# Patient Record
Sex: MALE | Race: WHITE | NOT HISPANIC OR LATINO | Employment: FULL TIME | URBAN - METROPOLITAN AREA
[De-identification: names, ages, dates, MRNs, and addresses within clinical notes are randomized per-mention and may not be internally consistent; named-entity substitution may affect disease eponyms.]

---

## 2023-10-10 DIAGNOSIS — F41.9 ANXIETY AND DEPRESSION: ICD-10-CM

## 2023-10-10 DIAGNOSIS — F32.A ANXIETY AND DEPRESSION: ICD-10-CM

## 2023-10-10 RX ORDER — SERTRALINE HYDROCHLORIDE 50 MG/1
50 TABLET, FILM COATED ORAL 2 TIMES DAILY
COMMUNITY
Start: 2022-01-13 | End: 2023-10-10 | Stop reason: SDUPTHER

## 2023-10-10 RX ORDER — SERTRALINE HYDROCHLORIDE 50 MG/1
50 TABLET, FILM COATED ORAL 2 TIMES DAILY
Qty: 60 TABLET | Refills: 0 | Status: SHIPPED | OUTPATIENT
Start: 2023-10-10 | End: 2023-12-14 | Stop reason: SDUPTHER

## 2023-11-06 DIAGNOSIS — F32.A ANXIETY AND DEPRESSION: ICD-10-CM

## 2023-11-06 DIAGNOSIS — F41.9 ANXIETY AND DEPRESSION: ICD-10-CM

## 2023-11-06 RX ORDER — SERTRALINE HYDROCHLORIDE 50 MG/1
TABLET, FILM COATED ORAL
Qty: 60 TABLET | Refills: 0 | OUTPATIENT
Start: 2023-11-06

## 2023-12-13 ENCOUNTER — TELEPHONE (OUTPATIENT)
Dept: PRIMARY CARE | Facility: CLINIC | Age: 18
End: 2023-12-13

## 2023-12-13 DIAGNOSIS — F32.A ANXIETY AND DEPRESSION: ICD-10-CM

## 2023-12-13 DIAGNOSIS — F41.9 ANXIETY AND DEPRESSION: ICD-10-CM

## 2023-12-14 RX ORDER — SERTRALINE HYDROCHLORIDE 50 MG/1
50 TABLET, FILM COATED ORAL 2 TIMES DAILY
Qty: 60 TABLET | Refills: 0 | Status: SHIPPED | OUTPATIENT
Start: 2023-12-14 | End: 2024-02-16 | Stop reason: SDUPTHER

## 2024-02-16 ENCOUNTER — TELEPHONE (OUTPATIENT)
Dept: PRIMARY CARE | Facility: CLINIC | Age: 19
End: 2024-02-16
Payer: MEDICAID

## 2024-02-16 DIAGNOSIS — F41.9 ANXIETY AND DEPRESSION: ICD-10-CM

## 2024-02-16 DIAGNOSIS — F32.A ANXIETY AND DEPRESSION: ICD-10-CM

## 2024-02-16 RX ORDER — SERTRALINE HYDROCHLORIDE 50 MG/1
50 TABLET, FILM COATED ORAL 2 TIMES DAILY
Qty: 60 TABLET | Refills: 0 | Status: SHIPPED | OUTPATIENT
Start: 2024-02-16 | End: 2024-03-18 | Stop reason: SDUPTHER

## 2024-02-16 NOTE — TELEPHONE ENCOUNTER
REFILL   ZOLOFT 50 MG TWICE DAILY  TOY KHALIL    PATIENT IS COMPLETELY OUT OF MED  AN APT IS SCHEDULED FOR 3/18/24 AND HE IS AWARE HE NEEDS TO BE SEEN  THANK YOU

## 2024-03-18 ENCOUNTER — OFFICE VISIT (OUTPATIENT)
Dept: PRIMARY CARE | Facility: CLINIC | Age: 19
End: 2024-03-18
Payer: MEDICAID

## 2024-03-18 ENCOUNTER — LAB (OUTPATIENT)
Dept: LAB | Facility: LAB | Age: 19
End: 2024-03-18
Payer: MEDICAID

## 2024-03-18 VITALS
DIASTOLIC BLOOD PRESSURE: 67 MMHG | SYSTOLIC BLOOD PRESSURE: 131 MMHG | BODY MASS INDEX: 22.51 KG/M2 | HEART RATE: 66 BPM | HEIGHT: 72 IN | OXYGEN SATURATION: 98 % | TEMPERATURE: 97.3 F | WEIGHT: 166.2 LBS

## 2024-03-18 DIAGNOSIS — F41.9 ANXIETY AND DEPRESSION: Primary | ICD-10-CM

## 2024-03-18 DIAGNOSIS — F32.A ANXIETY AND DEPRESSION: ICD-10-CM

## 2024-03-18 DIAGNOSIS — Z11.4 SCREENING FOR HIV WITHOUT PRESENCE OF RISK FACTORS: ICD-10-CM

## 2024-03-18 DIAGNOSIS — Z11.59 NEED FOR HEPATITIS C SCREENING TEST: ICD-10-CM

## 2024-03-18 DIAGNOSIS — F41.9 ANXIETY AND DEPRESSION: ICD-10-CM

## 2024-03-18 DIAGNOSIS — F32.A ANXIETY AND DEPRESSION: Primary | ICD-10-CM

## 2024-03-18 DIAGNOSIS — E55.9 VITAMIN D DEFICIENCY: ICD-10-CM

## 2024-03-18 LAB
25(OH)D3 SERPL-MCNC: 24 NG/ML (ref 30–100)
ALBUMIN SERPL BCP-MCNC: 4.7 G/DL (ref 3.4–5)
ALP SERPL-CCNC: 63 U/L (ref 33–120)
ALT SERPL W P-5'-P-CCNC: 16 U/L (ref 10–52)
ANION GAP SERPL CALC-SCNC: 13 MMOL/L (ref 10–20)
AST SERPL W P-5'-P-CCNC: 16 U/L (ref 9–39)
BASOPHILS # BLD AUTO: 0.07 X10*3/UL (ref 0–0.1)
BASOPHILS NFR BLD AUTO: 0.9 %
BILIRUB SERPL-MCNC: 0.3 MG/DL (ref 0–1.2)
BUN SERPL-MCNC: 15 MG/DL (ref 6–23)
CALCIUM SERPL-MCNC: 9.7 MG/DL (ref 8.6–10.6)
CHLORIDE SERPL-SCNC: 102 MMOL/L (ref 98–107)
CHOLEST SERPL-MCNC: 176 MG/DL (ref 0–199)
CHOLESTEROL/HDL RATIO: 3.3
CO2 SERPL-SCNC: 28 MMOL/L (ref 21–32)
CREAT SERPL-MCNC: 0.91 MG/DL (ref 0.5–1.3)
EGFRCR SERPLBLD CKD-EPI 2021: >90 ML/MIN/1.73M*2
EOSINOPHIL # BLD AUTO: 0.21 X10*3/UL (ref 0–0.7)
EOSINOPHIL NFR BLD AUTO: 2.8 %
ERYTHROCYTE [DISTWIDTH] IN BLOOD BY AUTOMATED COUNT: 12.1 % (ref 11.5–14.5)
GLUCOSE SERPL-MCNC: 90 MG/DL (ref 74–99)
HCT VFR BLD AUTO: 45.9 % (ref 41–52)
HCV AB SER QL: NONREACTIVE
HDLC SERPL-MCNC: 54 MG/DL
HGB BLD-MCNC: 14.8 G/DL (ref 13.5–17.5)
IMM GRANULOCYTES # BLD AUTO: 0.01 X10*3/UL (ref 0–0.7)
IMM GRANULOCYTES NFR BLD AUTO: 0.1 % (ref 0–0.9)
LDLC SERPL CALC-MCNC: 97 MG/DL
LYMPHOCYTES # BLD AUTO: 2.05 X10*3/UL (ref 1.2–4.8)
LYMPHOCYTES NFR BLD AUTO: 27.7 %
MCH RBC QN AUTO: 30 PG (ref 26–34)
MCHC RBC AUTO-ENTMCNC: 32.2 G/DL (ref 32–36)
MCV RBC AUTO: 93 FL (ref 80–100)
MONOCYTES # BLD AUTO: 0.59 X10*3/UL (ref 0.1–1)
MONOCYTES NFR BLD AUTO: 8 %
NEUTROPHILS # BLD AUTO: 4.48 X10*3/UL (ref 1.2–7.7)
NEUTROPHILS NFR BLD AUTO: 60.5 %
NON HDL CHOLESTEROL: 122 MG/DL (ref 0–149)
NRBC BLD-RTO: 0 /100 WBCS (ref 0–0)
PLATELET # BLD AUTO: 255 X10*3/UL (ref 150–450)
POTASSIUM SERPL-SCNC: 5 MMOL/L (ref 3.5–5.3)
PROT SERPL-MCNC: 7.3 G/DL (ref 6.4–8.2)
RBC # BLD AUTO: 4.93 X10*6/UL (ref 4.5–5.9)
SODIUM SERPL-SCNC: 138 MMOL/L (ref 136–145)
TRIGL SERPL-MCNC: 124 MG/DL (ref 0–149)
TSH SERPL-ACNC: 3.38 MIU/L (ref 0.44–3.98)
VLDL: 25 MG/DL (ref 0–40)
WBC # BLD AUTO: 7.4 X10*3/UL (ref 4.4–11.3)

## 2024-03-18 PROCEDURE — 86803 HEPATITIS C AB TEST: CPT

## 2024-03-18 PROCEDURE — 84443 ASSAY THYROID STIM HORMONE: CPT

## 2024-03-18 PROCEDURE — 99213 OFFICE O/P EST LOW 20 MIN: CPT | Performed by: INTERNAL MEDICINE

## 2024-03-18 PROCEDURE — 85025 COMPLETE CBC W/AUTO DIFF WBC: CPT

## 2024-03-18 PROCEDURE — 82306 VITAMIN D 25 HYDROXY: CPT

## 2024-03-18 PROCEDURE — 36415 COLL VENOUS BLD VENIPUNCTURE: CPT

## 2024-03-18 PROCEDURE — 1036F TOBACCO NON-USER: CPT | Performed by: INTERNAL MEDICINE

## 2024-03-18 PROCEDURE — 80053 COMPREHEN METABOLIC PANEL: CPT

## 2024-03-18 PROCEDURE — 80061 LIPID PANEL: CPT

## 2024-03-18 PROCEDURE — 87389 HIV-1 AG W/HIV-1&-2 AB AG IA: CPT

## 2024-03-18 RX ORDER — SERTRALINE HYDROCHLORIDE 50 MG/1
50 TABLET, FILM COATED ORAL 2 TIMES DAILY
Qty: 180 TABLET | Refills: 3 | Status: SHIPPED | OUTPATIENT
Start: 2024-03-18

## 2024-03-18 NOTE — PROGRESS NOTES
Subjective   Patient ID: Eriberto Beaulieu is a 19 y.o. male who presents for Follow-up (Med refill ).    Assessment/Plan     Problem List Items Addressed This Visit       Anxiety and depression - Primary     PHQ less than 5 continue Zoloft advised to get PHQ twice a year         Relevant Medications    sertraline (Zoloft) 50 mg tablet    Other Relevant Orders    HIV 1/2 Antigen/Antibody Screen with Reflex to Confirmation    Lipid panel    Hepatitis C antibody    CBC and Auto Differential    Comprehensive Metabolic Panel    TSH with reflex to Free T4 if abnormal    Vitamin D 25-Hydroxy,Total (for eval of Vitamin D levels)    Need for hepatitis C screening test    Relevant Orders    Hepatitis C antibody    Screening for HIV without presence of risk factors    Relevant Orders    HIV 1/2 Antigen/Antibody Screen with Reflex to Confirmation    Vitamin D deficiency     Advice your vitamin D level is low take vitamin C calcium plus vitamin D 50,000 unit one every week for 3 months and repeat testing or 3 months        Patient was evaluated today, problem list was reviewed, problems and concerns addressed, Rx list reviewed and updated, lab and tests were noted and reviewed. Life style changes were discussed, always it works better if we eat plant based diet and plenty of fibres and roughage. Consume adequate amount of water and avoid alcohol, light to moderate physical activities and stress reduction are always beneficial for ongoing physical well being. Do not forget to have 6 to 7 hours of sleep regularly and avoid late night meenu screen exposure.      HPI this is a 19-year-old patient have vitamin D deficiency associate with anxiety depression taking Zoloft and vitamin D supplement    Negative for suicide homicide    Negative for chemical abuse    Negative for nausea vomiting diarrhea constipation  Past Medical History:   Diagnosis Date    Attention-deficit hyperactivity disorder, predominantly inattentive type 01/13/2022     Attention deficit hyperactivity disorder (ADHD), predominantly inattentive type    Encounter for immunization 01/13/2022    Encounter for immunization    Personal history of other specified conditions 01/13/2022    History of palpitations     Past Surgical History:   Procedure Laterality Date    OTHER SURGICAL HISTORY  01/13/2022    No history of surgery     No Known Allergies  Current Outpatient Medications   Medication Sig Dispense Refill    sertraline (Zoloft) 50 mg tablet Take 1 tablet (50 mg) by mouth 2 times a day. Needs to be seen 180 tablet 3     No current facility-administered medications for this visit.     No family history on file.  Social History     Socioeconomic History    Marital status: Single     Spouse name: None    Number of children: None    Years of education: None    Highest education level: None   Occupational History    None   Tobacco Use    Smoking status: Never    Smokeless tobacco: Never   Substance and Sexual Activity    Alcohol use: Yes     Comment: social    Drug use: Never    Sexual activity: None   Other Topics Concern    None   Social History Narrative    None     Social Determinants of Health     Financial Resource Strain: Not on file   Food Insecurity: Not on file   Transportation Needs: Not on file   Physical Activity: Not on file   Stress: Not on file   Social Connections: Not on file   Intimate Partner Violence: Not on file   Housing Stability: Not on file     Immunization History   Administered Date(s) Administered    DTP 2003, 2003, 2003, 05/20/2004, 08/13/2008    DTaP vaccine, pediatric  (INFANRIX) 2003, 2003, 2003, 05/20/2004    Hepatitis B vaccine, pediatric/adolescent (RECOMBIVAX, ENGERIX) 2003, 2003, 02/02/2004    HiB, unspecified 2003, 2003, 02/02/2004    Hib / Hep B 2003, 2003, 02/02/2004    MMR and varicella combined vaccine, subcutaneous (PROQUAD) 06/01/2007    MMR vaccine, subcutaneous (MMR II)  02/02/2004, 06/01/2007    Meningococcal ACWY vaccine (MENVEO) 10/01/2020    Pneumococcal Conjugate PCV 7 2003, 2003, 2003, 01/27/2005    Poliovirus vaccine, subcutaneous (IPOL) 2003, 2003, 05/20/2004, 03/20/2005, 08/13/2008       Review of Systems  Review of systems is otherwise negative unless stated above or in history of present illness.    Objective   Visit Vitals  /67 (BP Location: Left arm, Patient Position: Sitting, BP Cuff Size: Adult)   Pulse 66   Temp 36.3 °C (97.3 °F)   Ht 1.829 m (6')   Wt 75.4 kg (166 lb 3.2 oz)   SpO2 98%   BMI 22.54 kg/m²   Smoking Status Never   BSA 1.96 m²     Physical Exam  Constitutional:       General: not in acute distress.   HENT:      Head: Normocephalic and atraumatic.      Nose: Nose normal.   Eyes:      Extraocular Movements: Extraocular movements intact.      Conjunctiva/sclera: Conjunctivae normal.   Cardiovascular:      Rate and Rhythm: Normal rate ,  No M/R/G  Pulmonary:      Effort: Pulmonary effort is normal.      Breath sounds: Normal, Bilat Equal AE  Skin:     General: Skin is warm.   Neurological:      Mental Status: He is alert and oriented to person, place, and time.   Psychiatric:         Mood and Affect: Mood normal.         Behavior: Behavior normal.   Musculoskeletal   FROM in all extremitirs,  Joint-no swelling or tenderness    No visits with results within 4 Month(s) from this visit.   Latest known visit with results is:   [facility] Encounter on 09/30/2022   Component Date Value Ref Range Status    Vitamin D, 25-Hydroxy 09/30/2022 24 (A)  ng/mL Final       Radiology: Reviewed imaging in powerchart.  No results found.      Charting was completed using voice recognition technology and may include unintended errors.

## 2024-03-19 ENCOUNTER — TELEPHONE (OUTPATIENT)
Dept: PRIMARY CARE | Facility: CLINIC | Age: 19
End: 2024-03-19
Payer: MEDICAID

## 2024-03-19 DIAGNOSIS — E55.9 VITAMIN D DEFICIENCY: ICD-10-CM

## 2024-03-19 LAB — HIV 1+2 AB+HIV1 P24 AG SERPL QL IA: NONREACTIVE

## 2024-03-19 RX ORDER — ERGOCALCIFEROL 1.25 MG/1
50000 CAPSULE ORAL
Qty: 12 CAPSULE | Refills: 0 | Status: SHIPPED | OUTPATIENT
Start: 2024-03-19 | End: 2024-06-11

## 2024-03-19 NOTE — TELEPHONE ENCOUNTER
----- Message from Lissette Garduno MA sent at 3/19/2024 10:45 AM EDT -----    ----- Message -----  From: Shayne Bailey MD  Sent: 3/19/2024  10:29 AM EDT  To: Lissette Garduno MA    Advice your vitamin D level is low take vitamin C calcium plus vitamin D 50,000 unit one every week for 3 months and repeat testing or 3 months

## 2024-12-05 ENCOUNTER — OFFICE VISIT (OUTPATIENT)
Dept: PRIMARY CARE | Facility: CLINIC | Age: 19
End: 2024-12-05
Payer: COMMERCIAL

## 2024-12-05 VITALS
HEART RATE: 69 BPM | SYSTOLIC BLOOD PRESSURE: 109 MMHG | DIASTOLIC BLOOD PRESSURE: 68 MMHG | OXYGEN SATURATION: 96 % | WEIGHT: 162.4 LBS | BODY MASS INDEX: 22 KG/M2 | HEIGHT: 72 IN

## 2024-12-05 DIAGNOSIS — F41.9 ANXIETY AND DEPRESSION: Primary | ICD-10-CM

## 2024-12-05 DIAGNOSIS — E55.9 VITAMIN D DEFICIENCY: ICD-10-CM

## 2024-12-05 DIAGNOSIS — F32.A ANXIETY AND DEPRESSION: Primary | ICD-10-CM

## 2024-12-05 DIAGNOSIS — R73.9 HYPERGLYCEMIA: ICD-10-CM

## 2024-12-05 PROBLEM — Z11.4 SCREENING FOR HIV WITHOUT PRESENCE OF RISK FACTORS: Status: RESOLVED | Noted: 2024-03-18 | Resolved: 2024-12-05

## 2024-12-05 PROBLEM — Z11.59 NEED FOR HEPATITIS C SCREENING TEST: Status: RESOLVED | Noted: 2024-03-18 | Resolved: 2024-12-05

## 2024-12-05 PROCEDURE — 99213 OFFICE O/P EST LOW 20 MIN: CPT | Performed by: INTERNAL MEDICINE

## 2024-12-05 PROCEDURE — 1036F TOBACCO NON-USER: CPT | Performed by: INTERNAL MEDICINE

## 2024-12-05 PROCEDURE — 3008F BODY MASS INDEX DOCD: CPT | Performed by: INTERNAL MEDICINE

## 2024-12-05 ASSESSMENT — PATIENT HEALTH QUESTIONNAIRE - PHQ9
SUM OF ALL RESPONSES TO PHQ9 QUESTIONS 1 AND 2: 0
1. LITTLE INTEREST OR PLEASURE IN DOING THINGS: NOT AT ALL
2. FEELING DOWN, DEPRESSED OR HOPELESS: NOT AT ALL

## 2024-12-05 NOTE — PROGRESS NOTES
Subjective   Patient ID: Eriberto Beaulieu is a 19 y.o. male who presents for Follow-up.    Assessment/Plan     Problem List Items Addressed This Visit       Anxiety and depression - Primary     PHQ 6 continue Zoloft follow-up every 6-month not suicidal         Relevant Orders    CBC and Auto Differential    Comprehensive Metabolic Panel    Magnesium    Lipid Panel    Hemoglobin A1C    TSH with reflex to Free T4 if abnormal    Vitamin D deficiency     Advice your vitamin D level is low take vitamin C calcium plus vitamin D 50,000 unit one every week for 3 months and repeat testing or 3 months         Relevant Orders    CBC and Auto Differential    Comprehensive Metabolic Panel    Magnesium    Lipid Panel    Hemoglobin A1C    TSH with reflex to Free T4 if abnormal    Vitamin D 25-Hydroxy,Total (for eval of Vitamin D levels)    Hyperglycemia    Relevant Orders    CBC and Auto Differential    Comprehensive Metabolic Panel    Magnesium    Lipid Panel    Hemoglobin A1C    TSH with reflex to Free T4 if abnormal     Patient was evaluated today, problem list was reviewed, problems and concerns addressed, Rx list reviewed and updated, lab and tests were noted and reviewed. Life style changes were discussed, always it works better if we eat plant based diet and plenty of fibres and roughage. Consume adequate amount of water and avoid alcohol, light to moderate physical activities and stress reduction are always beneficial for ongoing physical well being. Do not forget to have 6 to 7 hours of sleep regularly and avoid late night meenu screen exposure.    HPI 19-year-old patient who is single no children    No brother 1 sister    Mother father good health    Negative for drug alcohol marijuana    Personal history of poor concentration anxiety with depression onset gradually duration few months progress slowly aggravated by cold weather send social pressure    Negative for suicide homicide ideation    Negative for chemical  abuse    Negative for headache or chest pain    PHQ was done less than 6    Continue Zoloft given vitamin C vitamin D calcium supplement follow-up advised not to smoke drinks or any drugs  Past Medical History:   Diagnosis Date    Attention-deficit hyperactivity disorder, 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 Needs to be seen 180 tablet 3     No current facility-administered medications for this visit.     No family history on file.  Social History     Socioeconomic History    Marital status: Single   Tobacco Use    Smoking status: Never    Smokeless tobacco: Never   Substance and Sexual Activity    Alcohol use: Yes     Comment: social    Drug use: Never     Immunization History   Administered Date(s) Administered    DTP 2003, 2003, 2003, 05/20/2004, 08/13/2008    DTaP vaccine, pediatric  (INFANRIX) 2003, 2003, 2003, 05/20/2004    Hepatitis B vaccine, 19 yrs and under (RECOMBIVAX, ENGERIX) 2003, 2003, 02/02/2004    HiB, unspecified 2003, 2003, 02/02/2004    Hib / Hep B 2003, 2003, 02/02/2004    MMR and varicella combined vaccine, subcutaneous (PROQUAD) 06/01/2007    MMR vaccine, subcutaneous (MMR II) 02/02/2004, 06/01/2007    Meningococcal ACWY vaccine (MENVEO) 10/01/2020    Pneumococcal Conjugate PCV 7 2003, 2003, 2003, 01/27/2005    Poliovirus vaccine, subcutaneous (IPOL) 2003, 2003,  05/20/2004, 03/20/2005, 08/13/2008       Review of Systems  Review of systems is otherwise negative unless stated above or in history of present illness.    Objective   Visit Vitals  /68   Pulse 69   Ht 1.829 m (6')   Wt 73.7 kg (162 lb 6.4 oz)   SpO2 96%   BMI 22.03 kg/m²   Smoking Status Never   BSA 1.94 m²     Physical Exam  Constitutional:       General: not in acute distress.   HENT:      Head: Normocephalic and atraumatic.      Nose: Nose normal.   Eyes:      Extraocular Movements: Extraocular movements intact.      Conjunctiva/sclera: Conjunctivae normal.   Cardiovascular:      Rate and Rhythm: Normal rate ,  No M/R/G  Pulmonary:      Effort: Pulmonary effort is normal.      Breath sounds: Normal, Bilat Equal AE  Skin: Multiple skin tattoos     General: Skin is warm.   Neurological:      Mental Status: He is alert and oriented to person, place, and time.   Psychiatric:    Anxiety depression without suicide     Mood and Affect: Mood normal.         Behavior: Behavior normal.   Musculoskeletal   FROM in all extremitirs,  Joint-no swelling or tenderness    No visits with results within 4 Month(s) from this visit.   Latest known visit with results is:   Lab on 03/18/2024   Component Date Value Ref Range Status    HIV 1/2 Antigen/Antibody Screen wi* 03/18/2024 Nonreactive  Nonreactive Final    Cholesterol 03/18/2024 176  0 - 199 mg/dL Final    HDL-Cholesterol 03/18/2024 54.0  mg/dL Final    Cholesterol/HDL Ratio 03/18/2024 3.3   Final    LDL Calculated 03/18/2024 97  <=119 mg/dL Final    VLDL 03/18/2024 25  0 - 40 mg/dL Final    Triglycerides 03/18/2024 124  0 - 149 mg/dL Final    Non HDL Cholesterol 03/18/2024 122  0 - 149 mg/dL Final    Hepatitis C AB 03/18/2024 Nonreactive  Nonreactive Final    WBC 03/18/2024 7.4  4.4 - 11.3 x10*3/uL Final    nRBC 03/18/2024 0.0  0.0 - 0.0 /100 WBCs Final    RBC 03/18/2024 4.93  4.50 - 5.90 x10*6/uL Final    Hemoglobin 03/18/2024 14.8  13.5 - 17.5 g/dL Final    Hematocrit  03/18/2024 45.9  41.0 - 52.0 % Final    MCV 03/18/2024 93  80 - 100 fL Final    MCH 03/18/2024 30.0  26.0 - 34.0 pg Final    MCHC 03/18/2024 32.2  32.0 - 36.0 g/dL Final    RDW 03/18/2024 12.1  11.5 - 14.5 % Final    Platelets 03/18/2024 255  150 - 450 x10*3/uL Final    Neutrophils % 03/18/2024 60.5  40.0 - 80.0 % Final    Immature Granulocytes %, Automated 03/18/2024 0.1  0.0 - 0.9 % Final    Lymphocytes % 03/18/2024 27.7  13.0 - 44.0 % Final    Monocytes % 03/18/2024 8.0  2.0 - 10.0 % Final    Eosinophils % 03/18/2024 2.8  0.0 - 6.0 % Final    Basophils % 03/18/2024 0.9  0.0 - 2.0 % Final    Neutrophils Absolute 03/18/2024 4.48  1.20 - 7.70 x10*3/uL Final    Immature Granulocytes Absolute, Au* 03/18/2024 0.01  0.00 - 0.70 x10*3/uL Final    Lymphocytes Absolute 03/18/2024 2.05  1.20 - 4.80 x10*3/uL Final    Monocytes Absolute 03/18/2024 0.59  0.10 - 1.00 x10*3/uL Final    Eosinophils Absolute 03/18/2024 0.21  0.00 - 0.70 x10*3/uL Final    Basophils Absolute 03/18/2024 0.07  0.00 - 0.10 x10*3/uL Final    Glucose 03/18/2024 90  74 - 99 mg/dL Final    Sodium 03/18/2024 138  136 - 145 mmol/L Final    Potassium 03/18/2024 5.0  3.5 - 5.3 mmol/L Final    Chloride 03/18/2024 102  98 - 107 mmol/L Final    Bicarbonate 03/18/2024 28  21 - 32 mmol/L Final    Anion Gap 03/18/2024 13  10 - 20 mmol/L Final    Urea Nitrogen 03/18/2024 15  6 - 23 mg/dL Final    Creatinine 03/18/2024 0.91  0.50 - 1.30 mg/dL Final    eGFR 03/18/2024 >90  >60 mL/min/1.73m*2 Final    Calcium 03/18/2024 9.7  8.6 - 10.6 mg/dL Final    Albumin 03/18/2024 4.7  3.4 - 5.0 g/dL Final    Alkaline Phosphatase 03/18/2024 63  33 - 120 U/L Final    Total Protein 03/18/2024 7.3  6.4 - 8.2 g/dL Final    AST 03/18/2024 16  9 - 39 U/L Final    Bilirubin, Total 03/18/2024 0.3  0.0 - 1.2 mg/dL Final    ALT 03/18/2024 16  10 - 52 U/L Final    Thyroid Stimulating Hormone 03/18/2024 3.38  0.44 - 3.98 mIU/L Final    Vitamin D, 25-Hydroxy, Total 03/18/2024 24 (L)  30 - 100  ng/mL Final       Radiology: Reviewed imaging in powerchart.  No results found.      Charting was completed using voice recognition technology and may include unintended errors.

## 2025-03-31 DIAGNOSIS — F41.9 ANXIETY AND DEPRESSION: ICD-10-CM

## 2025-03-31 DIAGNOSIS — F32.A ANXIETY AND DEPRESSION: ICD-10-CM

## 2025-03-31 RX ORDER — SERTRALINE HYDROCHLORIDE 50 MG/1
50 TABLET, FILM COATED ORAL 2 TIMES DAILY
Qty: 180 TABLET | Refills: 1 | Status: SHIPPED | OUTPATIENT
Start: 2025-03-31

## 2025-04-29 DIAGNOSIS — F41.9 ANXIETY AND DEPRESSION: ICD-10-CM

## 2025-04-29 DIAGNOSIS — F32.A ANXIETY AND DEPRESSION: ICD-10-CM

## 2025-04-29 RX ORDER — SERTRALINE HYDROCHLORIDE 50 MG/1
50 TABLET, FILM COATED ORAL 2 TIMES DAILY
Qty: 180 TABLET | Refills: 1 | Status: SHIPPED | OUTPATIENT
Start: 2025-04-29